# Patient Record
Sex: FEMALE | Race: WHITE | NOT HISPANIC OR LATINO
[De-identification: names, ages, dates, MRNs, and addresses within clinical notes are randomized per-mention and may not be internally consistent; named-entity substitution may affect disease eponyms.]

---

## 2018-01-29 ENCOUNTER — RECORDS - HEALTHEAST (OUTPATIENT)
Dept: ADMINISTRATIVE | Facility: OTHER | Age: 73
End: 2018-01-29

## 2018-02-08 ENCOUNTER — RECORDS - HEALTHEAST (OUTPATIENT)
Dept: BONE DENSITY | Facility: CLINIC | Age: 73
End: 2018-02-08

## 2018-02-08 ENCOUNTER — RECORDS - HEALTHEAST (OUTPATIENT)
Dept: ADMINISTRATIVE | Facility: OTHER | Age: 73
End: 2018-02-08

## 2018-02-08 DIAGNOSIS — Z78.0 ASYMPTOMATIC MENOPAUSAL STATE: ICD-10-CM

## 2020-03-25 ENCOUNTER — COMMUNICATION - HEALTHEAST (OUTPATIENT)
Dept: FAMILY MEDICINE | Facility: CLINIC | Age: 75
End: 2020-03-25

## 2020-05-21 ENCOUNTER — RECORDS - HEALTHEAST (OUTPATIENT)
Dept: ADMINISTRATIVE | Facility: OTHER | Age: 75
End: 2020-05-21

## 2021-03-12 ENCOUNTER — RECORDS - HEALTHEAST (OUTPATIENT)
Dept: ADMINISTRATIVE | Facility: OTHER | Age: 76
End: 2021-03-12

## 2021-03-24 ENCOUNTER — RECORDS - HEALTHEAST (OUTPATIENT)
Dept: ADMINISTRATIVE | Facility: OTHER | Age: 76
End: 2021-03-24

## 2021-03-24 ENCOUNTER — RECORDS - HEALTHEAST (OUTPATIENT)
Dept: BONE DENSITY | Facility: CLINIC | Age: 76
End: 2021-03-24

## 2021-03-24 DIAGNOSIS — M85.80 OTHER SPECIFIED DISORDERS OF BONE DENSITY AND STRUCTURE, UNSPECIFIED SITE: ICD-10-CM

## 2021-03-24 DIAGNOSIS — Z78.0 ASYMPTOMATIC MENOPAUSAL STATE: ICD-10-CM

## 2021-05-26 ENCOUNTER — RECORDS - HEALTHEAST (OUTPATIENT)
Dept: ADMINISTRATIVE | Facility: CLINIC | Age: 76
End: 2021-05-26

## 2021-07-15 NOTE — TELEPHONE ENCOUNTER
New Appointment Needed  What is the reason for the visit:    Same Date/Next Day Appt Request  What is the reason for your visit?: New patient, Dr Frazier was recommended by Blanca Mata from [facility]/ [place]     Provider Preference: Dr Frazier   How soon do you need to be seen?: When clinic is open to see new patients. Patient just wants to establish cares.   Waitlist offered?: No  Okay to leave a detailed message:  Yes

## 2025-04-26 ENCOUNTER — TRANSFERRED RECORDS (OUTPATIENT)
Dept: HEALTH INFORMATION MANAGEMENT | Facility: CLINIC | Age: 80
End: 2025-04-26
Payer: COMMERCIAL

## 2025-04-26 ENCOUNTER — MEDICAL CORRESPONDENCE (OUTPATIENT)
Dept: HEALTH INFORMATION MANAGEMENT | Facility: CLINIC | Age: 80
End: 2025-04-26
Payer: COMMERCIAL

## 2025-04-26 LAB
ALT SERPL-CCNC: 29 U/L
AST SERPL-CCNC: 27 U/L (ref 14–36)
CHOLESTEROL (EXTERNAL): 179 MG/DL (ref 0–200)
CREATININE (EXTERNAL): 0.8 MG/DL (ref 0.7–1.4)
GLUCOSE (EXTERNAL): 93 MG/DL (ref 60–99)
HDLC SERPL-MCNC: 79 MG/DL (ref 35–65)
LDL CHOLESTEROL CALCULATED (EXTERNAL): 89 MG/DL (ref 0–130)
POTASSIUM (EXTERNAL): 4.4 MMOL/L (ref 3.5–5.1)
TRIGLYCERIDES (EXTERNAL): 55 MG/DL (ref 0–200)
TSH SERPL-ACNC: 2.92 MIU/L (ref 0.47–4.68)

## 2025-04-29 ENCOUNTER — TRANSFERRED RECORDS (OUTPATIENT)
Dept: HEALTH INFORMATION MANAGEMENT | Facility: CLINIC | Age: 80
End: 2025-04-29
Payer: COMMERCIAL

## 2025-04-30 ENCOUNTER — TRANSCRIBE ORDERS (OUTPATIENT)
Dept: OTHER | Age: 80
End: 2025-04-30

## 2025-04-30 DIAGNOSIS — M81.0 OSTEOPOROSIS: Primary | ICD-10-CM

## 2025-05-09 PROBLEM — M81.0 SENILE OSTEOPOROSIS: Status: RESOLVED | Noted: 2025-05-09 | Resolved: 2025-05-09

## 2025-05-09 PROBLEM — M81.0 SENILE OSTEOPOROSIS: Status: ACTIVE | Noted: 2025-05-09

## 2025-05-09 PROBLEM — M85.89 OSTEOPENIA OF MULTIPLE SITES: Status: ACTIVE | Noted: 2025-05-09

## 2025-05-09 PROBLEM — M41.9 SCOLIOSIS: Status: ACTIVE | Noted: 2025-05-09

## 2025-05-09 PROBLEM — C08.9: Status: ACTIVE | Noted: 2025-05-09

## 2025-05-09 PROBLEM — Z87.81 HISTORY OF WRIST FRACTURE: Status: ACTIVE | Noted: 2025-05-09

## 2025-05-13 ENCOUNTER — RESULTS FOLLOW-UP (OUTPATIENT)
Dept: INTERNAL MEDICINE | Facility: CLINIC | Age: 80
End: 2025-05-13
Payer: COMMERCIAL

## 2025-05-13 DIAGNOSIS — D47.2 MGUS (MONOCLONAL GAMMOPATHY OF UNKNOWN SIGNIFICANCE): Primary | ICD-10-CM

## 2025-05-13 NOTE — TELEPHONE ENCOUNTER
Called to patient, relayed provider's message in detail.     Patient will plan to come and  the letter tomorrow.     I provided patient with Hematology scheduling number as well.     No further questions at this time.     Homer ARITA RN

## 2025-05-13 NOTE — LETTER
May 13, 2025      Trisha Clinton  [address] [facility] [address]        Dear ,    We are writing to inform you of your test results.    Only abnormal lab result in the workup of osteoporosis is serum protein electrophoresis which showed possible abnormal immunoglobulin. This needs to be further evaluated by hematologist and I placed the referral. 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  Monoclonal gammopathy of undetermined significance (MGUS) is a condition in which an atypical protein is found in the blood. The protein is called monoclonal protein or M protein.  This protein is made in the soft, blood-producing tissue in the center of bones. This blood-producing tissue is bone marrow.   MGUS usually causes no problems. But sometimes it can lead to more-serious disease, called multiple myeloma.  People who have high amounts of this protein in the blood need regular checkups. That's so they can get earlier treatment if the condition gets worse. If it doesn't get worse, MGUS doesn't need treatment.      Resulted Orders   Bone specific alk phosphatase   Result Value Ref Range    Bone Spec Alk Phosphatase 9.6 ug/L      Comment:        INTERPRETIVE INFORMATION: Bone Specific Alkaline Phosphatase    Premenopausal Female:    4.5 - 16.9 ug/L    Postmenopausal Female:   7.0 - 22.4 ug/L  INTERPRETIVE INFORMATION: Bone Specific Alkaline Phosphatase  Liver alkaline phosphatase can affect the measurement of   bone specific alkaline phosphatase in this assay. Each 100   U/L of liver alkaline phosphatase contributes an additional   2.5 to 5.8 ug/L to the bone specific alkaline phosphatase   result.  Performed By: [facility] [employer]  [address] [place] [id number]  : Junior Coats MD, PhD  CLIA Number: [id number]   Magnesium   Result Value Ref Range    Magnesium 2.2 1.7 - 2.3  mg/dL   Elp random UR reflex UIEP   Result Value Ref Range    ELP Interpretation Urine       Only trace albumin and trace globulins. No obvious monoclonal protein seen. Pathologic significance requires clinical correlation. Jarrell Dyer MD   Parathyroid Hormone Intact   Result Value Ref Range    Parathyroid Hormone Intact 39 15 - 65 pg/mL    Narrative    This result was obtained with the Roche Elecsys PTH STAT assay.   This reference range differs from PTH assays used in other [facility] laboratories.   Vitamin D Deficiency   Result Value Ref Range    Vitamin D, Total (25-Hydroxy) 39 20 - 50 ng/mL      Comment:      optimum levels    Narrative    Season, race, dietary intake, and treatment affect the concentration of 25-hydroxy-Vitamin D. Values may decrease during winter months and increase during summer months.    Vitamin D determination is routinely performed by an immunoassay specific for 25 hydroxyvitamin D3.  If an individual is on vitamin D2(ergocalciferol) supplementation, please specify 25 OH vitamin D2 and D3 level determination by LCMSMS test VITD23.     CBC with platelets   Result Value Ref Range    WBC Count 6.3 4.0 - 11.0 10e3/uL    RBC Count 3.83 3.80 - 5.20 10e6/uL    Hemoglobin 12.5 11.7 - 15.7 g/dL    Hematocrit 37.2 35.0 - 47.0 %    MCV 97 78 - 100 fL    MCH 32.6 26.5 - 33.0 pg    MCHC 33.6 31.5 - 36.5 g/dL    RDW 12.5 10.0 - 15.0 %    Platelet Count 183 150 - 450 10e3/uL   Comprehensive metabolic panel   Result Value Ref Range    Sodium 141 135 - 145 mmol/L    Potassium 4.5 3.4 - 5.3 mmol/L    Carbon Dioxide (CO2) 28 22 - 29 mmol/L    Anion Gap 9 7 - 15 mmol/L    Urea Nitrogen 25.6 (H) 8.0 - 23.0 mg/dL    Creatinine 0.74 0.51 - 0.95 mg/dL    GFR Estimate 82 >60 mL/min/1.73m2      Comment:      eGFR calculated using 2021 CKD-EPI equation.    Calcium 9.9 8.8 - 10.4 mg/dL    Chloride 104 98 - 107 mmol/L    Glucose 105 (H) 70 - 99 mg/dL    Alkaline Phosphatase 75 40 - 150 U/L    AST 33  0 - 45 U/L    ALT 31 0 - 50 U/L    Protein Total 6.5 6.4 - 8.3 g/dL    Albumin 4.5 3.5 - 5.2 g/dL    Bilirubin Total 0.3 <=1.2 mg/dL   Protein Electrophoresis Serum with Reflex   Result Value Ref Range    Albumin 4.2 3.7 - 5.1 g/dL    Alpha 1 0.3 0.2 - 0.4 g/dL    Alpha 2 0.5 0.5 - 0.9 g/dL    Beta Globulin 0.6 0.6 - 1.0 g/dL    Gamma Globulin 0.6 (L) 0.7 - 1.6 g/dL    Monoclonal Peak 0.1 (H) <=0.0 g/dL    ELP Interpretation       Possible small monoclonal protein (0.1 g/dL) seen in the gamma fraction. See immunofixation report on same specimen. Hypogammaglobulinemia. Pathologic significance requires clinical correlation. Jarrell Dyer MD   Total Protein, Serum for ELP   Result Value Ref Range    Total Protein Serum for ELP 6.1 (L) 6.4 - 8.3 g/dL   Protein Immunofixation Serum   Result Value Ref Range    Immunofixation ELP       Possible faint IgG immunoglobulin of lambda light chain type. Pathologic significance requires clinical correlation. Jarrell Dyer MD       If you have any questions or concerns, please call the clinic at the number listed above.       Sincerely,      Karin Kilpatrick MD    Electronically signed

## 2025-05-20 ENCOUNTER — E-CONSULT (OUTPATIENT)
Dept: TRANSPLANT | Facility: CLINIC | Age: 80
End: 2025-05-20
Payer: COMMERCIAL

## 2025-05-20 PROCEDURE — 99451 NTRPROF PH1/NTRNET/EHR 5/>: CPT | Performed by: INTERNAL MEDICINE

## 2025-05-20 NOTE — PROGRESS NOTES
5/20/2025     E-Consult has been accepted.    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  Cr nl  Ca nl  Alb nl  LFTs nl  Tbil nl  TTG, VItD, bone spec alk phos nl   Hb 12.5 nl  Rest of CBC nl, no diff  SPEP/CANDY: Monoclonal peak 0.1 possible faint IgG lambda  Urine PEP: only trace albumin and trace globulins, no monoclonal protein seen    Recommendations:   -  Check serum kappa/lambda (free light chain) ratio and IgG/IgA/IgM.  Please notify me of results, I can help with interpretation.    If no anemia, hypercalcemia, or kidney failure, does not need further imaging if:  - IgG monoclonal protein <1.5 g/dL (15 g/L) and a normal serum kappa/lambda ratio  - Light chain only monoclonal protein with serum FLC ratio <8  - IgM monoclonal protein with no clinical concern for bone lesions or myeloma    - Given the concerns about osteopenia, can do a skeletal survey [id number].    Bone marrow evaluation can be deferred in individuals with no evidence of anemia, lymphadenopathy, or organomegaly with the following low-risk scenarios:  - IgG monoclonal protein <1.5 g/dL (15 g/L) with normal FLC ratio  - Light chain only monoclonal protein with FLC ratio <8  - IgM monoclonal protein <1.5 g/dL (15 g/L)      Low risk MGUS:   Follow CBC, CMP, SPEP, immunofixation, kappa/lambda ratio q6 months  If stable x 2, can then just follow CBC and CMP yearly.   If they meet low risk criteria above, then chances of MGUS transforming to MM is <1%/year.      Things for her to watch out for, which will need medical evaluation and repeat MGUS testing:  - symptoms of anemia (fatigue, looking pale,  orthostasis)  - increased bleeding/bruising  - serious infection, e.g. hospitalization but also frequent or prolonged viral infections.  - bony pains, unexplained fractures  - fever, night sweats soaking the sheets/clothes, unexplained weight loss  - enlarged lymph nodes  - appetite issues or swollen abdomen  - symptoms of hypercalcemia - unexplained constipation, unexplained polyuria     The recommendations provided in this E-Consult are based on a review of clinical data pertinent to the clinical question presented, without a review of the patient's complete medical record or, the benefit of a comprehensive in-person or virtual patient evaluation. This consultation should not replace the clinical judgement and evaluation of the provider ordering this E-Consult. Any new clinical issues, or changes in patient status since the filing of this E-Consult will need to be taken into account when assessing these recommendations. Please contact me if you have further questions.    My total time spent reviewing clinical information and formulating assessment was 10 minutes.    Zack Yeung MD

## 2025-05-21 ENCOUNTER — TELEPHONE (OUTPATIENT)
Dept: INTERNAL MEDICINE | Facility: CLINIC | Age: 80
End: 2025-05-21
Payer: COMMERCIAL

## 2025-05-21 NOTE — TELEPHONE ENCOUNTER
Outgoing call to patient. Mobile Number went straight to  and mailbox is not set up.     Home number is not in service and unable to leave a VM.     Unable to send a [employer] message.    -Outgoing call to daughter lAyssa- There was a Consent to communicate on file for daughter and reviewed the need for an appointment. [place] and will get something set up then.     Kimberly ELLIS RN

## 2025-05-21 NOTE — PROGRESS NOTES
Please call the pt and schedule follow-up visit with me to discuss Hem/Onc recommendations from the e-consult.

## 2025-05-28 ENCOUNTER — TELEPHONE (OUTPATIENT)
Dept: INTERNAL MEDICINE | Facility: CLINIC | Age: 80
End: 2025-05-28
Payer: COMMERCIAL

## 2025-05-28 NOTE — TELEPHONE ENCOUNTER
Reason for Call:  Appointment Request    Patient requesting this type of appt:  Virtual CONSULT with Dr. Kilpatrick and Hematologist    Requested provider:Dr. Kilpatrick and a hematologist    Reason patient unable to be scheduled: Needs to be scheduled by clinic    When does patient want to be seen/preferred time: ASAP    Comments: Pt rec'd letter requesting to schedule a virtual visit with Dr. Kilpatrick and a hematologist on May 13 but patient was out of the country until 5/28 so is just now requesting this appt.      Okay to leave a detailed message?: Yes at Cell number on file:    Telephone Information:   Mobile [phone]       Call taken on 5/28/2025 at 8:51 AM by Rafael Powers

## 2025-05-28 NOTE — TELEPHONE ENCOUNTER
Called Pt to schedule VV with Dr. MANN but Pt is on the phone and will call back.  When Pt calls back, please schedule a VV with Dr. Kilpatrick.    Morro Laughlin

## 2025-05-28 NOTE — TELEPHONE ENCOUNTER
Patient returned call. Patient requesting a telephone visit with Dr. Kilpatrick, PCP.  Patient does not have MyChart.  Telephone appointment scheduled with Dr. Kilpatrick on 6/11/25.  Patient said thank you.

## 2025-06-11 ENCOUNTER — VIRTUAL VISIT (OUTPATIENT)
Dept: INTERNAL MEDICINE | Facility: CLINIC | Age: 80
End: 2025-06-11
Payer: COMMERCIAL

## 2025-06-11 DIAGNOSIS — M85.89 OSTEOPENIA OF MULTIPLE SITES: ICD-10-CM

## 2025-06-11 DIAGNOSIS — Z87.81 HISTORY OF WRIST FRACTURE: ICD-10-CM

## 2025-06-11 DIAGNOSIS — D47.2 MGUS (MONOCLONAL GAMMOPATHY OF UNKNOWN SIGNIFICANCE): Primary | ICD-10-CM

## 2025-06-11 DIAGNOSIS — C08.9 SALIVARY GLAND CARCINOMA (H): ICD-10-CM

## 2025-06-11 PROCEDURE — 98014 SYNCH AUDIO-ONLY EST MOD 30: CPT | Performed by: INTERNAL MEDICINE

## 2025-06-11 RX ORDER — GABAPENTIN 600 MG/1
600 TABLET ORAL AT BEDTIME
COMMUNITY
Start: 2025-05-29

## 2025-06-11 NOTE — PROGRESS NOTES
Trisha is a 79 year old who is being evaluated via a billable telephone visit.    What phone number would you like to be contacted at?   How would you like to obtain your AVS? [employer]  Originating Location (pt. Location): Home    Distant Location (provider location):  On-site  Telephone visit completed due to the patient did not have access to video, while the distant provider did.    Assessment & Plan     MGUS (monoclonal gammopathy of unknown significance)    - Adult Oncology/Hematology  Referral; Future    Salivary gland carcinoma (H) in 2011 treated with surgery and radiation       Osteopenia of multiple sites    - Adult Oncology/Hematology  Referral; Future    History of wrist fracture in 2022      78 yo female with h/o salivary gland carcinoma in 2011 treated with surgery and radiation , was seen on 5/9/25 for osteopenia consult. She is on Fosamax weekly since 2022.  The last bone density scan was done on 4/29/25 at [facility] clinic:  L1-L4 T-score 0.9. she has scoliosis and significant artifacts on the spine images.  LFN T-score -1.7, stable compared to DXA in 2022.    We did the workup for secondary causes of the bone loss.  SPEP/Immunofixation:  Component  Ref Range & Units (hover) 1 mo ago     Albumin 4.2    Alpha 1 0.3    Alpha 2 0.5    Beta Globulin 0.6    Gamma Globulin 0.6 Low     Monoclonal Peak 0.1 High     ELP Interpretation Possible small monoclonal protein (0.1 g/dL) seen in the gamma fraction. See immunofixation report on same specimen. Hypogammaglobulinemia. Pathologic significance requires clinical correlation.      Component  Ref Range & Units (hover) 1 mo ago   Immunofixation ELP Possible faint IgG immunoglobulin of lambda light chain type. Pathologic significance requires clinical correlation.       E-consult was done with hematologist. He recommended:  Check serum kappa/lambda (free light chain) ratio and IgG/IgA/IgM.  Please notify me of results, I can help  with interpretation.     If no anemia, hypercalcemia, or kidney failure, does not need further imaging if:  - IgG monoclonal protein <1.5 g/dL (15 g/L) and a normal serum kappa/lambda ratio  - Light chain only monoclonal protein with serum FLC ratio <8  - IgM monoclonal protein with no clinical concern for bone lesions or myeloma    - Given the concerns about osteopenia, can do a skeletal survey [id number].     Bone marrow evaluation can be deferred in individuals with no evidence of anemia, lymphadenopathy, or organomegaly with the following low-risk scenarios:  - IgG monoclonal protein <1.5 g/dL (15 g/L) with normal FLC ratio  - Light chain only monoclonal protein with FLC ratio <8  - IgM monoclonal protein <1.5 g/dL (15 g/L)       Low risk MGUS:   Follow CBC, CMP, SPEP, immunofixation, kappa/lambda ratio q6 months  If stable x 2, can then just follow CBC and CMP yearly.   If they meet low risk criteria above, then chances of MGUS transforming to MM is <1%/year.        Things for her to watch out for, which will need medical evaluation and repeat MGUS testing:  - symptoms of anemia (fatigue, looking pale, orthostasis)  - increased bleeding/bruising  - serious infection, e.g. hospitalization but also frequent or prolonged viral infections.  - bony pains, unexplained fractures  - fever, night sweats soaking the sheets/clothes, unexplained weight loss  - enlarged lymph nodes  - appetite issues or swollen abdomen  - symptoms of hypercalcemia - unexplained constipation, unexplained polyuria     Patient was informed about the recommendations, but would like to discuss it with hematologist in virtual or office visit. Her family and herself are very concerned about this finding and possible multiple myeloma.  Referral provided today.          Chaka Rico is a 79 year old, presenting for the following health issues:  Follow Up (Review blood tests and plan. Hemotologist?)        6/11/2025    12:38 PM   Additional  Questions   Roomed by Little MACARIO                  Objective           Vitals:  No vitals were obtained today due to virtual visit.    Physical Exam   General: Alert and no distress //Respiratory: No audible wheeze, cough, or shortness of breath // Psychiatric:  Appropriate affect, tone, and pace of words            Phone call duration: 15 minutes  Signed Electronically by: Karin Kilpatrick MD

## 2025-06-12 ENCOUNTER — PATIENT OUTREACH (OUTPATIENT)
Dept: ONCOLOGY | Facility: CLINIC | Age: 80
End: 2025-06-12
Payer: COMMERCIAL

## 2025-06-12 NOTE — PROGRESS NOTES
Hematology referral reviewed for [facility], see below.    Referral reason: Referral received from primary care for MGUS, previously placed e-consult with labs recommended, per referring provider, patient wants further work up with Hematology    Clinical question entered by referring provider or through order transcription: MGUS    Referral received via: Internal referral by [facility]    Current abnormal labs: Available in Chart Review      Plan: Triage instructions updated and sent to NPS for completion.

## 2025-06-12 NOTE — TELEPHONE ENCOUNTER
RECORDS STATUS - ALL OTHER DIAGNOSIS      RECORDS RECEIVED FROM: [facility]   NOTES STATUS DETAILS   OFFICE NOTE from referring provider Epic 6/11/25: Dr. Karin Kilpatrick   MEDICATION LIST [facility]    LABS     ANYTHING RELATED TO DIAGNOSIS Epic Most recent 5/9/25

## 2025-07-11 ENCOUNTER — PRE VISIT (OUTPATIENT)
Dept: ONCOLOGY | Facility: HOSPITAL | Age: 80
End: 2025-07-11
Payer: COMMERCIAL

## 2025-07-11 ENCOUNTER — ONCOLOGY VISIT (OUTPATIENT)
Dept: ONCOLOGY | Facility: HOSPITAL | Age: 80
End: 2025-07-11
Attending: INTERNAL MEDICINE
Payer: MEDICARE

## 2025-07-11 VITALS
HEIGHT: 68 IN | BODY MASS INDEX: 19.07 KG/M2 | WEIGHT: 125.8 LBS | DIASTOLIC BLOOD PRESSURE: 61 MMHG | HEART RATE: 76 BPM | SYSTOLIC BLOOD PRESSURE: 142 MMHG | OXYGEN SATURATION: 95 % | RESPIRATION RATE: 16 BRPM | TEMPERATURE: 98.1 F

## 2025-07-11 DIAGNOSIS — M85.89 OSTEOPENIA OF MULTIPLE SITES: ICD-10-CM

## 2025-07-11 DIAGNOSIS — D47.2 MGUS (MONOCLONAL GAMMOPATHY OF UNKNOWN SIGNIFICANCE): ICD-10-CM

## 2025-07-11 RX ORDER — ACETAMINOPHEN 500 MG
500-1000 TABLET ORAL ONCE
COMMUNITY

## 2025-07-11 ASSESSMENT — PAIN SCALES - GENERAL: PAINLEVEL_OUTOF10: NO PAIN (0)

## 2025-07-11 NOTE — Clinical Note
"7/11/2025      Trisha Clinton  [address] [place] [address]      Dear Colleague,    Thank you for referring your patient, Trisha Clinton, to the [facility]. Please see a copy of my visit note below.    Oncology Rooming Note    July 11, 2025 2:19 PM   Trisha Clinton is a 79 year old female who presents for:    Chief Complaint   Patient presents with    Hematology     MGUS     Initial Vitals: BP (!) 142/61   Pulse 76   Temp 98.1  F (36.7  C)   Resp 16   Ht 1.73 m (5' 8.11\")   Wt 57.1 kg (125 lb 12.8 oz)   LMP  (LMP Unknown)   SpO2 95%   BMI 19.07 kg/m   Estimated body mass index is 19.07 kg/m  as calculated from the following:    Height as of this encounter: 1.73 m (5' 8.11\").    Weight as of this encounter: 57.1 kg (125 lb 12.8 oz). Body surface area is 1.66 meters squared.  No Pain (0) Comment: Data Unavailable   No LMP recorded (lmp unknown). Patient is postmenopausal.  Allergies reviewed: Yes  Medications reviewed: Yes    Medications: Medication refills not needed today.  Pharmacy name entered into PHRQL: [facility] [address] [place], [place] - [address]    Frailty Screening:   Is the patient here for a new oncology consult visit in cancer care? 2. No    PHQ9:  Did this patient require a PHQ9?: No      Clinical concerns: New PT      Layla Laughlin LPN                          Again, thank you for allowing me to participate in the care of your patient.        Sincerely,        Autumn Mahoney MD    Electronically signed"

## 2025-07-11 NOTE — PROGRESS NOTES
"Oncology Rooming Note    July 11, 2025 2:19 PM   Trisha Clinton is a 79 year old female who presents for:    Chief Complaint   Patient presents with    Hematology     MGUS     Initial Vitals: BP (!) 142/61   Pulse 76   Temp 98.1  F (36.7  C)   Resp 16   Ht 1.73 m (5' 8.11\")   Wt 57.1 kg (125 lb 12.8 oz)   LMP  (LMP Unknown)   SpO2 95%   BMI 19.07 kg/m   Estimated body mass index is 19.07 kg/m  as calculated from the following:    Height as of this encounter: 1.73 m (5' 8.11\").    Weight as of this encounter: 57.1 kg (125 lb 12.8 oz). Body surface area is 1.66 meters squared.  No Pain (0) Comment: Data Unavailable   No LMP recorded (lmp unknown). Patient is postmenopausal.  Allergies reviewed: Yes  Medications reviewed: Yes    Medications: Medication refills not needed today.  Pharmacy name entered into Client Outlook: [facility] [address] [place] [address] [address] [address] [address] [address]    Frailty Screening:   Is the patient here for a new oncology consult visit in cancer care? 2. No    PHQ9:  Did this patient require a PHQ9?: No      Clinical concerns: New PT      Layla Laughlin LPN                      "